# Patient Record
Sex: MALE | Race: WHITE | NOT HISPANIC OR LATINO | URBAN - METROPOLITAN AREA
[De-identification: names, ages, dates, MRNs, and addresses within clinical notes are randomized per-mention and may not be internally consistent; named-entity substitution may affect disease eponyms.]

---

## 2017-11-15 ENCOUNTER — OFFICE VISIT (OUTPATIENT)
Dept: INTERNAL MEDICINE | Facility: MEDICAL CENTER | Age: 27
End: 2017-11-15
Payer: COMMERCIAL

## 2017-11-15 VITALS
DIASTOLIC BLOOD PRESSURE: 82 MMHG | TEMPERATURE: 97.6 F | OXYGEN SATURATION: 95 % | HEART RATE: 100 BPM | BODY MASS INDEX: 40 KG/M2 | HEIGHT: 71 IN | SYSTOLIC BLOOD PRESSURE: 128 MMHG | WEIGHT: 285.7 LBS | RESPIRATION RATE: 17 BRPM

## 2017-11-15 DIAGNOSIS — E66.01 MORBID OBESITY (HCC): ICD-10-CM

## 2017-11-15 DIAGNOSIS — F19.10 POLYSUBSTANCE ABUSE (HCC): ICD-10-CM

## 2017-11-15 DIAGNOSIS — Z23 FLU VACCINE NEED: ICD-10-CM

## 2017-11-15 DIAGNOSIS — F32.A DEPRESSION, UNSPECIFIED DEPRESSION TYPE: ICD-10-CM

## 2017-11-15 DIAGNOSIS — F98.8 ATTENTION DEFICIT DISORDER, UNSPECIFIED HYPERACTIVITY PRESENCE: ICD-10-CM

## 2017-11-15 PROCEDURE — 99204 OFFICE O/P NEW MOD 45 MIN: CPT | Mod: GC,25 | Performed by: INTERNAL MEDICINE

## 2017-11-15 PROCEDURE — 90471 IMMUNIZATION ADMIN: CPT | Performed by: INTERNAL MEDICINE

## 2017-11-15 PROCEDURE — 90686 IIV4 VACC NO PRSV 0.5 ML IM: CPT | Performed by: INTERNAL MEDICINE

## 2017-11-15 RX ORDER — FLUOXETINE HYDROCHLORIDE 20 MG/1
20 CAPSULE ORAL DAILY
COMMUNITY

## 2017-11-15 RX ORDER — ATOMOXETINE 40 MG/1
40 CAPSULE ORAL DAILY
COMMUNITY

## 2017-11-15 RX ORDER — TRAZODONE HYDROCHLORIDE 150 MG/1
150 TABLET ORAL NIGHTLY
COMMUNITY

## 2017-11-15 RX ORDER — TOPIRAMATE 25 MG/1
25 TABLET ORAL 2 TIMES DAILY
COMMUNITY

## 2017-11-15 RX ORDER — QUETIAPINE FUMARATE 200 MG/1
200 TABLET, FILM COATED ORAL 2 TIMES DAILY
COMMUNITY

## 2017-11-15 RX ORDER — LORAZEPAM 1 MG/1
1 TABLET ORAL EVERY 4 HOURS PRN
COMMUNITY

## 2017-11-15 ASSESSMENT — PATIENT HEALTH QUESTIONNAIRE - PHQ9
CLINICAL INTERPRETATION OF PHQ2 SCORE: 6
SUM OF ALL RESPONSES TO PHQ QUESTIONS 1-9: 23
5. POOR APPETITE OR OVEREATING: 3 - NEARLY EVERY DAY

## 2017-11-15 ASSESSMENT — PAIN SCALES - GENERAL: PAINLEVEL: NO PAIN

## 2017-11-15 NOTE — PROGRESS NOTES
"Dougie Valenzuela is a 27 y.o. male here for a non-provider visit for:   FLU    Reason for immunization: Annual Flu Vaccine  Immunization records indicate need for vaccine: Yes, confirmed with [facility]  Minimum interval has been met for this vaccine: Yes  ABN completed: Yes    Order and dose verified by: TRINA MAN Dated  08/07/2015 was given to patient: Yes  All IAC Questionnaire questions were answered \"No.\"    Patient tolerated injection and no adverse effects were observed or reported: Yes    Pt scheduled for next dose in series: Not Indicated    "

## 2017-11-15 NOTE — PROGRESS NOTES
New Patient to Establish    Reason to establish: New patient to establish    CC: Depression symptoms and establish PCP     HPI:   Dougie Valenzuela is a 27 y.o. Male with past medical history of polysubstance abuse with no IV route use, abuse includes cocaine, meth, ecstasy, alcohol, patient recently off all drugs and following ability counseling place for [facility], no alcohol use for the last 6 months, no other drugs except meth was used after her lost his job, loss insurance and psych follow-up, patient claim suicidal ideation for a purpose of seeing psych doctor at [facility], he denies true suicidal intention at that time, the patient had diagnosis of depression three years ago and was on quetiapine prescribed by a psychiatrist before he lost his job, his depression mainly because of his body weight, he denies any sexual issues, family issues.  Patient discharge a week ago from [facility] and discharged fluoxetine, trazodone, topiramate, lorazepam, atomoxetine by the psychiatrist in [place], he is currently still feeling depressed but denies any suicidal ideation or attempt, he will have an appointment tomorrow with the new psychiatrist who will go over his medications.       Review of Systems:     Constitutional: Denies fevers, denies weight changes  Eyes: Denies changes in vision, no eye pain  Ears/Nose/Throat/Mouth: Denies nasal congestion or sore throat   Cardiovascular: Denies chest pain or palpitations   Respiratory: Denies shortness of breath , Denies cough  Gastrointestinal/Hepatic: Denies abdominal pain, nausea, vomiting, diarrhea or constipation.  Genitourinary: Denies bladder dysfunction, dysuria or frequency  Musculoskeletal/Rheum: Denies  joint pain and swelling   Skin: Denies rash.  Neurological: Denies headache, confusion, memory loss or focal weakness/parasthesias  Psychiatric: Depression with no suicide         Patient Active Problem List    Diagnosis Date Noted   •  "Polysubstance abuse 11/15/2017   • Depression 11/15/2017   • Attention deficit disorder 11/15/2017       Past Medical History:   Diagnosis Date   • Attention deficit disorder    • Major depression     2015   • Polysubstance abuse        Current Outpatient Prescriptions   Medication Sig Dispense Refill   • quetiapine (SEROQUEL) 200 MG Tab Take 200 mg by mouth 2 times a day.     • topiramate (TOPAMAX) 25 MG Tab Take 25 mg by mouth 2 times a day.     • fluoxetine (PROZAC) 20 MG Cap Take 20 mg by mouth every day.     • trazodone (DESYREL) 150 MG Tab Take 150 mg by mouth every evening.     • lorazepam (ATIVAN) 1 MG Tab Take 1 mg by mouth every four hours as needed for Anxiety.     • atomoxetine (STRATTERA) 40 MG capsule Take 40 mg by mouth every day.       No current facility-administered medications for this visit.        Allergies as of 11/15/2017   • (No Known Allergies)       Social History     Social History   • Marital status: Single     Spouse name: N/A   • Number of children: N/A   • Years of education: N/A     Occupational History   • Not on file.     Social History Main Topics   • Smoking status: Current Every Day Smoker   • Smokeless tobacco: Never Used      Comment: vapor pen   • Alcohol use No   • Drug use:      Types: Methamphetamines, Cocaine      Comment: no IV abuse per report   • Sexual activity: No     Other Topics Concern   • Not on file     Social History Narrative   • No narrative on file       Family History   Problem Relation Age of Onset   • Diabetes Father    • Diabetes Maternal Grandfather        Past Surgical History:   Procedure Laterality Date   • TONSILLECTOMY         /82   Pulse 100   Temp 36.4 °C (97.6 °F)   Resp 17   Ht 1.791 m (5' 10.5\")   Wt (!) 129.6 kg (285 lb 11.2 oz)   SpO2 95%   BMI 40.41 kg/m²     Physical Exam  General:  Alert and oriented, No apparent distress. Morbid obese  Eyes: Pupils equal and reactive. No scleral icterus.  Throat: Clear no erythema or exudates " noted.  Neck: Supple. No lymphadenopathy noted. Thyroid not enlarged.  Lungs: Clear to auscultation bilaterally. No wheezes, rhonchi or crackles.  Cardiovascular: Regular rate and rhythm. No murmurs, rubs or gallops.  Abdomen:  Soft, non tender, non distended. Bowel sounds positive.  Extremities: No clubbing, cyanosis, edema. No incisions or needle marks   Skin: Clear. No rash or suspicious skin lesions noted.      Assessment and Plan    1. Depression, unspecified depression type   - The patient still feel depress, loss of energy, loss of interest,   -  Source of depression is his body weight  -  Will have an appointment with psych tomorrow to go over his medication  -  Discussion with the patient about the importance of compliance with medications and potential side effect with unsupervised stopping them  - PHQ 23, without suicidal ideation   - no previous medication free period in the last three years,     Plan  - Continue fluoxetine, trazodone, topiramate, lorazepam, quetiapine   - Follow with psych tomorrow for start getting the prescription by them  - CBC WITH DIFFERENTIAL; Future  - COMP METABOLIC PANEL; Future  - LIPID PROFILE; Future    2. Polysubstance abuse  - Stopped meth after getting last prescriptions from [place] last week  - last time took cocaine or ecstasy more than year ago   - Will try to quit vapor smoking in the future but not at this point as main focus to stay away from other drugs and alcohol  - HIV ANTIBODIES; Future    3. Flu vaccine need  - Flu Quad Inj >3 Year Pre-Filled PF    4. Morbid obesity (CMS-[facility])  - Patient educated about weight loss program in [facility], exercise and eat a healthy diet     Plan  - REFERRAL TO NUTRITION SERVICES IN [facility]  - LIPID PROFILE; Future    5. Attention deficit disorder, unspecified hyperactivity presence  - The patient was off medication and used meth during the time he has no refill of the prescription, report no IV use  - [facility] prescribed  atomoxetine               Signed by: Maria Luz Dowling M.D.

## 2023-10-19 ENCOUNTER — OFFICE VISIT (OUTPATIENT)
Dept: URGENT CARE | Facility: CLINIC | Age: 33
End: 2023-10-19

## 2023-10-19 VITALS
TEMPERATURE: 96.9 F | HEART RATE: 89 BPM | RESPIRATION RATE: 16 BRPM | DIASTOLIC BLOOD PRESSURE: 88 MMHG | OXYGEN SATURATION: 99 % | HEIGHT: 72 IN | WEIGHT: 239.8 LBS | BODY MASS INDEX: 32.48 KG/M2 | SYSTOLIC BLOOD PRESSURE: 138 MMHG

## 2023-10-19 DIAGNOSIS — H10.13 ALLERGIC CONJUNCTIVITIS OF BOTH EYES: ICD-10-CM

## 2023-10-19 PROCEDURE — 3075F SYST BP GE 130 - 139MM HG: CPT | Performed by: PHYSICIAN ASSISTANT

## 2023-10-19 PROCEDURE — 99203 OFFICE O/P NEW LOW 30 MIN: CPT | Performed by: PHYSICIAN ASSISTANT

## 2023-10-19 PROCEDURE — 3079F DIAST BP 80-89 MM HG: CPT | Performed by: PHYSICIAN ASSISTANT

## 2023-10-19 RX ORDER — METHYLPREDNISOLONE 4 MG/1
TABLET ORAL
Qty: 21 TABLET | Refills: 0 | Status: SHIPPED | OUTPATIENT
Start: 2023-10-19

## 2023-10-19 RX ORDER — PREDNISOLONE ACETATE 10 MG/ML
1 SUSPENSION/ DROPS OPHTHALMIC 4 TIMES DAILY
Qty: 10 ML | Refills: 0 | Status: SHIPPED | OUTPATIENT
Start: 2023-10-19

## 2023-10-19 RX ORDER — KETOROLAC TROMETHAMINE 5 MG/ML
1 SOLUTION OPHTHALMIC 4 TIMES DAILY
COMMUNITY

## 2023-10-19 RX ORDER — POLYMYXIN B SULFATE AND TRIMETHOPRIM 1; 10000 MG/ML; [USP'U]/ML
1 SOLUTION OPHTHALMIC EVERY 4 HOURS
COMMUNITY

## 2023-10-19 ASSESSMENT — ENCOUNTER SYMPTOMS
FOREIGN BODY SENSATION: 0
CARDIOVASCULAR NEGATIVE: 1
EYE PAIN: 0
EYE DISCHARGE: 1
NEUROLOGICAL NEGATIVE: 1
EYE ITCHING: 1
NAUSEA: 0
RESPIRATORY NEGATIVE: 1
EYE REDNESS: 1
CHILLS: 0
VOMITING: 0
DOUBLE VISION: 0
ABDOMINAL PAIN: 0
PHOTOPHOBIA: 1
BLURRED VISION: 1
FEVER: 0

## 2023-10-19 NOTE — PROGRESS NOTES
Subjective     Dougie Valenzuela is a 32 y.o. male who presents with Conjunctivitis (Bilateral pink that is not improving since last at a different UC. Was prescribed a cream for the eye's but had a reaction to cream.)            Third visit to urgent care.  First visit: 10/7 different urgent care system.  Diagnosed with left conjunctivitis and started on erythromycin.  He uses as prescribed with no relief  Second visit: 10/15 different urgent care system.  He return to clinic with bilateral eye redness, swelling, itching, pain and watering.  He was told to stop erythromycin and started on Acular and Polytrim for presumed bilateral conjunctivitis.    Today: Patient continues to have significant bilateral eye redness, irritation, watering and itching.  He is also having some visual disturbances such as photophobia and blurred vision      Eye Problem   Both eyes are affected. This is a new problem. The current episode started 1 to 4 weeks ago. The problem occurs constantly. The problem has been unchanged. There was no injury mechanism. There is Known exposure to pink eye. He Does not wear contacts. Associated symptoms include blurred vision, an eye discharge, eye redness, itching, photophobia and a recent URI. Pertinent negatives include no double vision, fever, foreign body sensation, nausea or vomiting. He has tried eye drops for the symptoms. The treatment provided mild relief.       PMH:  has a past medical history of Attention deficit disorder, Major depression, and Polysubstance abuse (HCC).  MEDS:   Current Outpatient Medications:     ketorolac (ACULAR) 0.5 % Solution, Administer 1 Drop into both eyes 4 times a day., Disp: , Rfl:     polymixin-trimethoprim (POLYTRIM) 38856-8.1 UNIT/ML-% Solution, Administer 1 Drop into both eyes every 4 hours., Disp: , Rfl:     prednisoLONE acetate (PRED FORTE) 1 % Suspension, Administer 1 Drop into both eyes 4 times a day., Disp: 10 mL, Rfl: 0    methylPREDNISolone  (MEDROL DOSEPAK) 4 MG Tablet Therapy Pack, Follow schedule on package instructions., Disp: 21 Tablet, Rfl: 0    quetiapine (SEROQUEL) 200 MG Tab, Take 200 mg by mouth 2 times a day., Disp: , Rfl:     topiramate (TOPAMAX) 25 MG Tab, Take 25 mg by mouth 2 times a day., Disp: , Rfl:     fluoxetine (PROZAC) 20 MG Cap, Take 20 mg by mouth every day., Disp: , Rfl:     trazodone (DESYREL) 150 MG Tab, Take 150 mg by mouth every evening., Disp: , Rfl:     lorazepam (ATIVAN) 1 MG Tab, Take 1 mg by mouth every four hours as needed for Anxiety., Disp: , Rfl:     atomoxetine (STRATTERA) 40 MG capsule, Take 40 mg by mouth every day., Disp: , Rfl:   ALLERGIES:   Allergies   Allergen Reactions    Erythromycin Swelling     0.5 eye ointment that caused swelling      SURGHX:   Past Surgical History:   Procedure Laterality Date    TONSILLECTOMY       SOCHX:  reports that he has quit smoking. His smoking use included cigarettes. He has never used smokeless tobacco. He reports that he does not currently use drugs after having used the following drugs: Methamphetamines and Cocaine. He reports that he does not drink alcohol.  FH: family history includes Diabetes in his father and maternal grandfather.      Review of Systems   Constitutional:  Negative for chills and fever.   HENT: Negative.     Eyes:  Positive for blurred vision, photophobia, discharge, redness and itching. Negative for double vision and pain.   Respiratory: Negative.     Cardiovascular: Negative.    Gastrointestinal:  Negative for abdominal pain, nausea and vomiting.   Neurological: Negative.        Medications, Allergies, and current problem list reviewed today in Epic           Objective     /88   Pulse 89   Temp 36.1 °C (96.9 °F) (Temporal)   Resp 16   Ht 1.829 m (6')   Wt 109 kg (239 lb 12.8 oz)   SpO2 99%   BMI 32.52 kg/m²      Physical Exam  Vitals and nursing note reviewed.   Constitutional:       General: He is not in acute distress.     Appearance:  Normal appearance. He is well-developed. He is not diaphoretic.   HENT:      Head: Normocephalic.      Right Ear: Hearing and external ear normal.      Left Ear: Hearing and external ear normal.      Nose: Nose normal.      Mouth/Throat:      Mouth: Mucous membranes are moist.   Eyes:      General: Lids are everted, no foreign bodies appreciated. Vision grossly intact. No allergic shiner.        Right eye: Discharge present.         Left eye: Discharge present.     Extraocular Movements: Extraocular movements intact.      Conjunctiva/sclera:      Right eye: Right conjunctiva is injected.      Left eye: Left conjunctiva is injected.      Pupils: Pupils are equal, round, and reactive to light.      Right eye: No corneal abrasion or fluorescein uptake.      Left eye: No corneal abrasion or fluorescein uptake.      Slit lamp exam:     Right eye: No hyphema.      Left eye: No hyphema.      Comments: Eyelid edema and erythema noted.  No obvious corneal ulcers noted   Cardiovascular:      Rate and Rhythm: Normal rate and regular rhythm.   Pulmonary:      Effort: Pulmonary effort is normal. No respiratory distress.      Breath sounds: Normal breath sounds.   Musculoskeletal:      Cervical back: Normal range of motion and neck supple.   Skin:     General: Skin is warm and dry.   Neurological:      General: No focal deficit present.      Mental Status: He is alert and oriented to person, place, and time. Mental status is at baseline.   Psychiatric:         Mood and Affect: Mood normal.         Behavior: Behavior normal.         Thought Content: Thought content normal.         Judgment: Judgment normal.                             Assessment & Plan     This is a very pleasant 32-year-old male presenting with ongoing bilateral eye redness, irritation, discharge, watering.  Today he had an abrupt increase in photophobia and visual disturbance.  Of note his girlfriend did originally have conjunctivitis. Thorough review of his  chart shows 2 previous visits to separate urgent care system.  Initially diagnosed with left conjunctivitis and started on erythromycin.  He had no improvement in the symptoms spread to the other eye.  He was then reevaluated and started on Polytrim and Acular drops for presumed bilateral conjunctivitis.  He has used treatments as directed with no relief.  Today he presents with worsening symptoms.  There is no headache, dizziness, fever or URI symptoms.  Vital signs are normal.  Exam shows bilateral eyelid edema, erythema with clear watery discharge.  Increased pruritus noted.  Conjunctival injection appreciated.  Lids everted without foreign body or gross deformity.  No allergic shiners and vision at baseline.  Snellen vision check within normal limits.  PERRLA, EOMs intact.  Fluorescein with Woods lamp exam performed on both eyes showing no abrasion, uptake, foreign body, ulcer.  Remainder of exam benign/reassuring.  I believe patient is having ongoing allergic reaction to erythromycin ointment.  He has no true conjunctivitis symptoms.  We will stop all antibiotic treatment.  He will be treated with prednisone and steroid drops.  He must follow-up with eye specialty within the next 24 hours, if not he will report to ER for thorough ocular exam including slit lamp exam and IOP check    1. Allergic conjunctivitis of both eyes  prednisoLONE acetate (PRED FORTE) 1 % Suspension    methylPREDNISolone (MEDROL DOSEPAK) 4 MG Tablet Therapy Pack          I personally reviewed prior external notes and test results pertinent to today's visit. Return to clinic or go to ED if symptoms worsen or persist. Red flag symptoms and indications for ED discussed at length. Patient/Parent/Guardian voices understanding.  AVS with post-visit instructions provided or given verbally.  Follow-up with your primary care provider in 3-5 days. All side effects and potential interactions of prescribed medication discussed including allergic  response, GI upset, tendon injury, rash, sedation, OCP effectiveness, etc.    Please note that this dictation was created using voice recognition software. I have made every reasonable attempt to correct obvious errors, but I expect that there are errors of grammar and possibly content that I did not discover before finalizing the note.

## 2023-10-26 ASSESSMENT — VISUAL ACUITY: OU: 1
